# Patient Record
Sex: MALE | Race: WHITE | Employment: FULL TIME | URBAN - METROPOLITAN AREA
[De-identification: names, ages, dates, MRNs, and addresses within clinical notes are randomized per-mention and may not be internally consistent; named-entity substitution may affect disease eponyms.]

---

## 2024-03-27 ENCOUNTER — TELEPHONE (OUTPATIENT)
Dept: GASTROENTEROLOGY | Facility: CLINIC | Age: 34
End: 2024-03-27
Payer: OTHER GOVERNMENT

## 2024-03-27 ENCOUNTER — APPOINTMENT (OUTPATIENT)
Dept: SURGERY | Facility: CLINIC | Age: 34
End: 2024-03-27

## 2024-03-27 NOTE — TELEPHONE ENCOUNTER
[facility] was scheduled in the [facility] (wrong department).  I have left a message with patient to schedule his general surgery appt. With Dr. Paulson in our [place] office.

## 2024-03-28 ENCOUNTER — APPOINTMENT (OUTPATIENT)
Dept: SURGERY | Facility: CLINIC | Age: 34
End: 2024-03-28
Payer: OTHER GOVERNMENT

## 2024-03-29 ENCOUNTER — OFFICE VISIT (OUTPATIENT)
Dept: SURGERY | Facility: CLINIC | Age: 34
End: 2024-03-29
Payer: OTHER GOVERNMENT

## 2024-03-29 VITALS
SYSTOLIC BLOOD PRESSURE: 126 MMHG | HEIGHT: 74 IN | HEART RATE: 68 BPM | BODY MASS INDEX: 29.67 KG/M2 | OXYGEN SATURATION: 98 % | DIASTOLIC BLOOD PRESSURE: 85 MMHG | TEMPERATURE: 98.3 F | RESPIRATION RATE: 18 BRPM | WEIGHT: 231.2 LBS

## 2024-03-29 DIAGNOSIS — K80.10 CALCULUS OF GALLBLADDER WITH CHRONIC CHOLECYSTITIS WITHOUT OBSTRUCTION: Primary | ICD-10-CM

## 2024-03-29 PROBLEM — K80.64 CALCULUS OF GALLBLADDER AND BILE DUCT WITH CHRONIC CHOLECYSTITIS WITHOUT OBSTRUCTION: Status: RESOLVED | Noted: 2024-03-29 | Resolved: 2024-03-29

## 2024-03-29 PROBLEM — K80.64 CALCULUS OF GALLBLADDER AND BILE DUCT WITH CHRONIC CHOLECYSTITIS WITHOUT OBSTRUCTION: Status: ACTIVE | Noted: 2024-03-29

## 2024-03-29 PROCEDURE — 1036F TOBACCO NON-USER: CPT | Performed by: SURGERY

## 2024-03-29 PROCEDURE — 99204 OFFICE O/P NEW MOD 45 MIN: CPT | Performed by: SURGERY

## 2024-03-29 RX ORDER — SODIUM CHLORIDE, SODIUM LACTATE, POTASSIUM CHLORIDE, CALCIUM CHLORIDE 600; 310; 30; 20 MG/100ML; MG/100ML; MG/100ML; MG/100ML
100 INJECTION, SOLUTION INTRAVENOUS CONTINUOUS
Status: CANCELLED | OUTPATIENT
Start: 2024-04-16

## 2024-03-29 RX ORDER — CEFAZOLIN SODIUM 2 G/100ML
2 INJECTION, SOLUTION INTRAVENOUS ONCE
Status: CANCELLED | OUTPATIENT
Start: 2024-04-16 | End: 2024-03-29

## 2024-03-29 NOTE — LETTER
March 29, 2024     Lovely Walls MD  [address]  [facility] [address]    Patient: Nikhil Hartman   YOB: 1990   Date of Visit: 3/29/2024       Dear Dr. Lovely Walls MD:    Thank you for referring Nikhil Hartman to me for evaluation. Below are my notes for this consultation.  If you have questions, please do not hesitate to call me. I look forward to following your patient along with you.       Sincerely,     Adan Crockett MD      CC: No Recipients  ______________________________________________________________________________________    History Of Present Illness  HPI   The patient is a 33-year-old male who on February 5 developed postprandial right upper quadrant abdominal pain associated with nausea and vomiting.  He lost 40 pounds in 1 month due to inability to eat.  Fatty foods triggered the symptoms.  He has changed his diet and now has gained 10 pounds.  The pain radiates to his back.  No history of jaundice.  He had some diarrhea for 1 week in February which has resolved.    Past medical history:  Hypertension  ADHD  [place] teeth extraction.    Medications: Patient does not recall the names of his medications.  He was told to bring a list to the office.    Allergies: Possible allergy to penicillin as a child.  He does not recall the reaction.    Past Medical History  He has a past medical history of Acute frontal sinusitis, unspecified (10/23/2013), Cough, unspecified (01/04/2013), Other conditions influencing health status (02/12/2013), Pain in thoracic spine (04/12/2016), and Sprain of unspecified part of unspecified wrist and hand, initial encounter (09/04/2013).    Surgical History  He has a past surgical history that includes [place] tooth extraction.     Allergies  Cat dander and Penicillins    Social History  He reports that he has never smoked. He has never used smokeless tobacco. He reports that he does not currently use alcohol. He reports that he does not use  "drugs.    Family History  No family history on file.    Review of Systems  Review of Systems:  Constitutional:  no fever, no chills, weight decreased from 260 to 220 pounds in 1 month.  He has gained 10 pounds recently.  Neurological: No history of CVA or seizure disorder  Eyes: Visual change.  I told him to follow-up with his primary care physician or ophthalmologist.  ENT:  No recent hearing loss  Neck: Right-sided neck pain for which she is going to see a neurologist.  Cardiovascular: No chest pain, no history of cardiac disease such as myocardial infarction or arrhythmia or congestive heart failure  Pulmonary: Asthma.  No shortness of breath, no other history of pulmonary disease such as pneumonia or COPD  Breast: No history of breast disease or breast mass  Gastrointestinal:   As above.  No blood in stool.  No history of ulcers.  No liver or pancreas disease.  No intestinal disorder.  Genitourinary: No hematuria or dysuria, no kidney disease  Musculoskeletal:  no arthralgia, no muscle or bone pain  Integumentary:  no rash  Psychiatric:  No anxiety or depression  Endocrine:  no history of diabetes  Hematologic/Lymphatic: No easy bruising or bleeding          Last Recorded Vitals  Blood pressure 126/85, pulse 68, temperature 36.8 °C (98.3 °F), temperature source Temporal, resp. rate 18, height 1.88 m (6' 2\"), weight 105 kg (231 lb 3.2 oz), SpO2 98 %.    Physical Exam  Constitutional: Well-developed, well-nourished, alert and oriented, no acute distress  Skin: Warm and dry, no lesions, no rashes, no jaundice  HEENT: Normocephalic, atraumatic, EOMI, no scleral icterus, eyes have no redness or swelling or discharge, external inspection of ears and nose is normal, mucous membranes moist  Neck: Soft, nontender, no mass or adenopathy  Cardiac: Regular rate and rhythm, no murmur  Chest: Patent airway, clear to auscultation, normal breath sounds with good chest expansion, no wheezes or rales or rhonchi noted, thorax " symmetric  Abdomen: Nondistended, positive bowel sounds, soft, minimal right upper quadrant tenderness, otherwise nontender, no mass  Rectal: Not performed  Extremities: No injury, no lower extremity edema or calf tenderness  Lymphatic: No cervical adenopathy  Musculoskeletal: Range of motion intact, no joint swelling, normal strength  Neurological: Alert and oriented x3, intact sensory and motor function, no obvious focal neurologic abnormalities, normal gait  Psychological: Appropriate mood and behavior    Relevant Results  I reviewed the ultrasound report.  The images are not available for review.  Cholelithiasis.  Possible fatty liver.    I reviewed the labs from March 7, 2024: WBC 6.7, hemoglobin 16.9, platelet 285  Electrolytes normal, BUN 12, creatinine 1.44, glucose 73  Liver function tests and lipase normal    Assessment/Plan   Diagnoses and all orders for this visit:  Calculus of gallbladder with chronic cholecystitis without obstruction  -     Case Request Operating Room: Laparoscopic cholecystectomy with cholangiogram.  Possible open operation.; Standing  Other orders  -     Place in outpatient/hospital ambulatory surgery; Standing  -     Vital Signs; Standing  -     Pulse oximetry, spot; Standing  -     Apply sequential compression device; Standing  -     Insert peripheral IV; Standing  -     Saline lock IV; Standing  -     lactated Ringer's infusion  -     Full code; Standing  -     ceFAZolin in dextrose (iso-os) (Ancef) IVPB 2 g    33-year-old male with cholelithiasis and symptoms consistent with chronic cholecystitis.  I told the patient that some of his symptoms are not from his gallbladder and would not resolve postop.  Recommend laparoscopic cholecystectomy with cholangiogram with possible open operation.  I discussed the procedure and risks with the patient. The risks include bleeding, infection, bile leak, injury to surrounding structures such as the common bile duct/duodenum,  cardiac/pulmonary/renal complications, post op diarrhea.  The patient does wish to have the operation.  Electronic consent obtained.  Avoid fatty foods preop.  He reports he has been referred to a hepatologist for evaluation of his possible fatty liver.  He does not recall all of his medications and he will bring the list to the office.    Adan Crockett MD

## 2024-03-29 NOTE — H&P (VIEW-ONLY)
History Of Present Illness  HPI   The patient is a 33-year-old male who on February 5 developed postprandial right upper quadrant abdominal pain associated with nausea and vomiting.  He lost 40 pounds in 1 month due to inability to eat.  Fatty foods triggered the symptoms.  He has changed his diet and now has gained 10 pounds.  The pain radiates to his back.  No history of jaundice.  He had some diarrhea for 1 week in February which has resolved.    Past medical history:  Hypertension  ADHD  [place] teeth extraction.    Medications: Patient does not recall the names of his medications.  He was told to bring a list to the office.    Allergies: Possible allergy to penicillin as a child.  He does not recall the reaction.    Past Medical History  He has a past medical history of Acute frontal sinusitis, unspecified (10/23/2013), Cough, unspecified (01/04/2013), Other conditions influencing health status (02/12/2013), Pain in thoracic spine (04/12/2016), and Sprain of unspecified part of unspecified wrist and hand, initial encounter (09/04/2013).    Surgical History  He has a past surgical history that includes [place] tooth extraction.     Allergies  Cat dander and Penicillins    Social History  He reports that he has never smoked. He has never used smokeless tobacco. He reports that he does not currently use alcohol. He reports that he does not use drugs.    Family History  No family history on file.    Review of Systems  Review of Systems:  Constitutional:  no fever, no chills, weight decreased from 260 to 220 pounds in 1 month.  He has gained 10 pounds recently.  Neurological: No history of CVA or seizure disorder  Eyes: Visual change.  I told him to follow-up with his primary care physician or ophthalmologist.  ENT:  No recent hearing loss  Neck: Right-sided neck pain for which she is going to see a neurologist.  Cardiovascular: No chest pain, no history of cardiac disease such as myocardial infarction or arrhythmia  "or congestive heart failure  Pulmonary: Asthma.  No shortness of breath, no other history of pulmonary disease such as pneumonia or COPD  Breast: No history of breast disease or breast mass  Gastrointestinal:   As above.  No blood in stool.  No history of ulcers.  No liver or pancreas disease.  No intestinal disorder.  Genitourinary: No hematuria or dysuria, no kidney disease  Musculoskeletal:  no arthralgia, no muscle or bone pain  Integumentary:  no rash  Psychiatric:  No anxiety or depression  Endocrine:  no history of diabetes  Hematologic/Lymphatic: No easy bruising or bleeding          Last Recorded Vitals  Blood pressure 126/85, pulse 68, temperature 36.8 °C (98.3 °F), temperature source Temporal, resp. rate 18, height 1.88 m (6' 2\"), weight 105 kg (231 lb 3.2 oz), SpO2 98 %.    Physical Exam  Constitutional: Well-developed, well-nourished, alert and oriented, no acute distress  Skin: Warm and dry, no lesions, no rashes, no jaundice  HEENT: Normocephalic, atraumatic, EOMI, no scleral icterus, eyes have no redness or swelling or discharge, external inspection of ears and nose is normal, mucous membranes moist  Neck: Soft, nontender, no mass or adenopathy  Cardiac: Regular rate and rhythm, no murmur  Chest: Patent airway, clear to auscultation, normal breath sounds with good chest expansion, no wheezes or rales or rhonchi noted, thorax symmetric  Abdomen: Nondistended, positive bowel sounds, soft, minimal right upper quadrant tenderness, otherwise nontender, no mass  Rectal: Not performed  Extremities: No injury, no lower extremity edema or calf tenderness  Lymphatic: No cervical adenopathy  Musculoskeletal: Range of motion intact, no joint swelling, normal strength  Neurological: Alert and oriented x3, intact sensory and motor function, no obvious focal neurologic abnormalities, normal gait  Psychological: Appropriate mood and behavior    Relevant Results  I reviewed the ultrasound report.  The images are not " available for review.  Cholelithiasis.  Possible fatty liver.    I reviewed the labs from March 7, 2024: WBC 6.7, hemoglobin 16.9, platelet 285  Electrolytes normal, BUN 12, creatinine 1.44, glucose 73  Liver function tests and lipase normal    Assessment/Plan   Diagnoses and all orders for this visit:  Calculus of gallbladder with chronic cholecystitis without obstruction  -     Case Request Operating Room: Laparoscopic cholecystectomy with cholangiogram.  Possible open operation.; Standing  Other orders  -     Place in outpatient/hospital ambulatory surgery; Standing  -     Vital Signs; Standing  -     Pulse oximetry, spot; Standing  -     Apply sequential compression device; Standing  -     Insert peripheral IV; Standing  -     Saline lock IV; Standing  -     lactated Ringer's infusion  -     Full code; Standing  -     ceFAZolin in dextrose (iso-os) (Ancef) IVPB 2 g    33-year-old male with cholelithiasis and symptoms consistent with chronic cholecystitis.  I told the patient that some of his symptoms are not from his gallbladder and would not resolve postop.  Recommend laparoscopic cholecystectomy with cholangiogram with possible open operation.  I discussed the procedure and risks with the patient. The risks include bleeding, infection, bile leak, injury to surrounding structures such as the common bile duct/duodenum, cardiac/pulmonary/renal complications, post op diarrhea.  The patient does wish to have the operation.  Electronic consent obtained.  Avoid fatty foods preop.  He reports he has been referred to a hepatologist for evaluation of his possible fatty liver.  He does not recall all of his medications and he will bring the list to the office.    Adan Crockett MD

## 2024-03-29 NOTE — PROGRESS NOTES
History Of Present Illness  HPI   The patient is a 33-year-old male who on February 5 developed postprandial right upper quadrant abdominal pain associated with nausea and vomiting.  He lost 40 pounds in 1 month due to inability to eat.  Fatty foods triggered the symptoms.  He has changed his diet and now has gained 10 pounds.  The pain radiates to his back.  No history of jaundice.  He had some diarrhea for 1 week in February which has resolved.    Past medical history:  Hypertension  ADHD  [place] teeth extraction.    Medications: Patient does not recall the names of his medications.  He was told to bring a list to the office.    Allergies: Possible allergy to penicillin as a child.  He does not recall the reaction.    Past Medical History  He has a past medical history of Acute frontal sinusitis, unspecified (10/23/2013), Cough, unspecified (01/04/2013), Other conditions influencing health status (02/12/2013), Pain in thoracic spine (04/12/2016), and Sprain of unspecified part of unspecified wrist and hand, initial encounter (09/04/2013).    Surgical History  He has a past surgical history that includes [place] tooth extraction.     Allergies  Cat dander and Penicillins    Social History  He reports that he has never smoked. He has never used smokeless tobacco. He reports that he does not currently use alcohol. He reports that he does not use drugs.    Family History  No family history on file.    Review of Systems  Review of Systems:  Constitutional:  no fever, no chills, weight decreased from 260 to 220 pounds in 1 month.  He has gained 10 pounds recently.  Neurological: No history of CVA or seizure disorder  Eyes: Visual change.  I told him to follow-up with his primary care physician or ophthalmologist.  ENT:  No recent hearing loss  Neck: Right-sided neck pain for which she is going to see a neurologist.  Cardiovascular: No chest pain, no history of cardiac disease such as myocardial infarction or arrhythmia  "or congestive heart failure  Pulmonary: Asthma.  No shortness of breath, no other history of pulmonary disease such as pneumonia or COPD  Breast: No history of breast disease or breast mass  Gastrointestinal:   As above.  No blood in stool.  No history of ulcers.  No liver or pancreas disease.  No intestinal disorder.  Genitourinary: No hematuria or dysuria, no kidney disease  Musculoskeletal:  no arthralgia, no muscle or bone pain  Integumentary:  no rash  Psychiatric:  No anxiety or depression  Endocrine:  no history of diabetes  Hematologic/Lymphatic: No easy bruising or bleeding          Last Recorded Vitals  Blood pressure 126/85, pulse 68, temperature 36.8 °C (98.3 °F), temperature source Temporal, resp. rate 18, height 1.88 m (6' 2\"), weight 105 kg (231 lb 3.2 oz), SpO2 98 %.    Physical Exam  Constitutional: Well-developed, well-nourished, alert and oriented, no acute distress  Skin: Warm and dry, no lesions, no rashes, no jaundice  HEENT: Normocephalic, atraumatic, EOMI, no scleral icterus, eyes have no redness or swelling or discharge, external inspection of ears and nose is normal, mucous membranes moist  Neck: Soft, nontender, no mass or adenopathy  Cardiac: Regular rate and rhythm, no murmur  Chest: Patent airway, clear to auscultation, normal breath sounds with good chest expansion, no wheezes or rales or rhonchi noted, thorax symmetric  Abdomen: Nondistended, positive bowel sounds, soft, minimal right upper quadrant tenderness, otherwise nontender, no mass  Rectal: Not performed  Extremities: No injury, no lower extremity edema or calf tenderness  Lymphatic: No cervical adenopathy  Musculoskeletal: Range of motion intact, no joint swelling, normal strength  Neurological: Alert and oriented x3, intact sensory and motor function, no obvious focal neurologic abnormalities, normal gait  Psychological: Appropriate mood and behavior    Relevant Results  I reviewed the ultrasound report.  The images are not " available for review.  Cholelithiasis.  Possible fatty liver.    I reviewed the labs from March 7, 2024: WBC 6.7, hemoglobin 16.9, platelet 285  Electrolytes normal, BUN 12, creatinine 1.44, glucose 73  Liver function tests and lipase normal    Assessment/Plan   Diagnoses and all orders for this visit:  Calculus of gallbladder with chronic cholecystitis without obstruction  -     Case Request Operating Room: Laparoscopic cholecystectomy with cholangiogram.  Possible open operation.; Standing  Other orders  -     Place in outpatient/hospital ambulatory surgery; Standing  -     Vital Signs; Standing  -     Pulse oximetry, spot; Standing  -     Apply sequential compression device; Standing  -     Insert peripheral IV; Standing  -     Saline lock IV; Standing  -     lactated Ringer's infusion  -     Full code; Standing  -     ceFAZolin in dextrose (iso-os) (Ancef) IVPB 2 g    33-year-old male with cholelithiasis and symptoms consistent with chronic cholecystitis.  I told the patient that some of his symptoms are not from his gallbladder and would not resolve postop.  Recommend laparoscopic cholecystectomy with cholangiogram with possible open operation.  I discussed the procedure and risks with the patient. The risks include bleeding, infection, bile leak, injury to surrounding structures such as the common bile duct/duodenum, cardiac/pulmonary/renal complications, post op diarrhea.  The patient does wish to have the operation.  Electronic consent obtained.  Avoid fatty foods preop.  He reports he has been referred to a hepatologist for evaluation of his possible fatty liver.  He does not recall all of his medications and he will bring the list to the office.    Adan Crockett MD

## 2024-04-02 RX ORDER — DEXTROAMPHETAMINE SACCHARATE, AMPHETAMINE ASPARTATE MONOHYDRATE, DEXTROAMPHETAMINE SULFATE AND AMPHETAMINE SULFATE 6.25; 6.25; 6.25; 6.25 MG/1; MG/1; MG/1; MG/1
25 CAPSULE, EXTENDED RELEASE ORAL DAILY
COMMUNITY

## 2024-04-02 RX ORDER — DEXTROAMPHETAMINE SACCHARATE, AMPHETAMINE ASPARTATE, DEXTROAMPHETAMINE SULFATE AND AMPHETAMINE SULFATE 2.5; 2.5; 2.5; 2.5 MG/1; MG/1; MG/1; MG/1
1 TABLET ORAL 2 TIMES DAILY
COMMUNITY

## 2024-04-02 RX ORDER — ESOMEPRAZOLE MAGNESIUM 40 MG/1
40 CAPSULE, DELAYED RELEASE ORAL
COMMUNITY
Start: 2024-03-07

## 2024-04-15 RX ORDER — CYCLOBENZAPRINE HCL 10 MG
10 TABLET ORAL 3 TIMES DAILY PRN
COMMUNITY

## 2024-04-15 RX ORDER — FLUTICASONE PROPIONATE 50 MCG
1 SPRAY, SUSPENSION (ML) NASAL DAILY
COMMUNITY

## 2024-04-15 NOTE — PREPROCEDURE INSTRUCTIONS
Current Medications   Medication Instructions    amphetamine-dextroamphetamine (Adderall) 10 mg tablet Stop 2 days before surgery    amphetamine-dextroamphetamine XR (Adderall XR) 25 mg 24 hr capsule Stop 2 days before surgery    cyclobenzaprine (Flexeril) 10 mg tablet Take morning of surgery with sip of water, no other fluids    esomeprazole (NexIUM) 40 mg DR capsule Take morning of surgery with sip of water, no other fluids    fluticasone (Flonase) 50 mcg/actuation nasal spray May take in the am of surgery          NPO Instructions: Nothing to eat or drink after midnight. May take medications as discussed in the am of surgery with a sip of water.   Additional Instructions: Enter through main entrance of [facility] building, located at [address]. Proceed to registration, located in the back right hand corner. You will need your ID and insurance card for registration. Please ensure you have a responsible adult to drive you home.     Shower the morning of or night before your procedure. After you shower avoid lotions, powders, deodorants or anything applied to the skin. If you wear contacts or glasses, wear the glasses. If you do not have glasses, please bring a case for your contacts. You may wear hearing aids and dentures, bring a case for them or we will provide one. Make sure you wear something loose and comfortable. Keep in mind your surgery type and wear something that will accommodate incisions or bandages. Please remove all jewelry and piercings.      For further questions Alessio MENDOZA can be contacted at [phone] between 7AM-3PM.

## 2024-04-16 ENCOUNTER — ANESTHESIA (OUTPATIENT)
Dept: OPERATING ROOM | Facility: HOSPITAL | Age: 34
End: 2024-04-16
Payer: OTHER GOVERNMENT

## 2024-04-16 ENCOUNTER — APPOINTMENT (OUTPATIENT)
Dept: RADIOLOGY | Facility: HOSPITAL | Age: 34
End: 2024-04-16
Payer: OTHER GOVERNMENT

## 2024-04-16 ENCOUNTER — ANESTHESIA EVENT (OUTPATIENT)
Dept: OPERATING ROOM | Facility: HOSPITAL | Age: 34
End: 2024-04-16
Payer: OTHER GOVERNMENT

## 2024-04-16 ENCOUNTER — HOSPITAL ENCOUNTER (OUTPATIENT)
Facility: HOSPITAL | Age: 34
Setting detail: OUTPATIENT SURGERY
Discharge: HOME | End: 2024-04-16
Attending: SURGERY | Admitting: SURGERY
Payer: OTHER GOVERNMENT

## 2024-04-16 VITALS
TEMPERATURE: 97.7 F | BODY MASS INDEX: 29.71 KG/M2 | HEART RATE: 74 BPM | OXYGEN SATURATION: 95 % | HEIGHT: 74 IN | DIASTOLIC BLOOD PRESSURE: 82 MMHG | WEIGHT: 231.48 LBS | SYSTOLIC BLOOD PRESSURE: 136 MMHG | RESPIRATION RATE: 16 BRPM

## 2024-04-16 DIAGNOSIS — K80.64 CALCULUS OF GALLBLADDER AND BILE DUCT WITH CHRONIC CHOLECYSTITIS WITHOUT OBSTRUCTION: Primary | ICD-10-CM

## 2024-04-16 DIAGNOSIS — K80.10 CALCULUS OF GALLBLADDER WITH CHRONIC CHOLECYSTITIS WITHOUT OBSTRUCTION: ICD-10-CM

## 2024-04-16 PROBLEM — F90.0 ATTENTION DEFICIT HYPERACTIVITY DISORDER (ADHD), PREDOMINANTLY INATTENTIVE TYPE: Status: ACTIVE | Noted: 2022-10-26

## 2024-04-16 PROCEDURE — 2500000004 HC RX 250 GENERAL PHARMACY W/ HCPCS (ALT 636 FOR OP/ED): Performed by: SURGERY

## 2024-04-16 PROCEDURE — 2500000004 HC RX 250 GENERAL PHARMACY W/ HCPCS (ALT 636 FOR OP/ED): Performed by: ANESTHESIOLOGIST ASSISTANT

## 2024-04-16 PROCEDURE — 7100000009 HC PHASE TWO TIME - INITIAL BASE CHARGE: Performed by: SURGERY

## 2024-04-16 PROCEDURE — 3700000001 HC GENERAL ANESTHESIA TIME - INITIAL BASE CHARGE: Performed by: SURGERY

## 2024-04-16 PROCEDURE — 2500000005 HC RX 250 GENERAL PHARMACY W/O HCPCS: Performed by: ANESTHESIOLOGIST ASSISTANT

## 2024-04-16 PROCEDURE — 3600000008 HC OR TIME - EACH INCREMENTAL 1 MINUTE - PROCEDURE LEVEL THREE: Performed by: SURGERY

## 2024-04-16 PROCEDURE — 2500000004 HC RX 250 GENERAL PHARMACY W/ HCPCS (ALT 636 FOR OP/ED): Performed by: ANESTHESIOLOGY

## 2024-04-16 PROCEDURE — 7100000001 HC RECOVERY ROOM TIME - INITIAL BASE CHARGE: Performed by: SURGERY

## 2024-04-16 PROCEDURE — 88304 TISSUE EXAM BY PATHOLOGIST: CPT | Performed by: PATHOLOGY

## 2024-04-16 PROCEDURE — 2720000007 HC OR 272 NO HCPCS: Performed by: SURGERY

## 2024-04-16 PROCEDURE — 88304 TISSUE EXAM BY PATHOLOGIST: CPT | Mod: TC,PARLAB | Performed by: SURGERY

## 2024-04-16 PROCEDURE — 7100000002 HC RECOVERY ROOM TIME - EACH INCREMENTAL 1 MINUTE: Performed by: SURGERY

## 2024-04-16 PROCEDURE — 3600000003 HC OR TIME - INITIAL BASE CHARGE - PROCEDURE LEVEL THREE: Performed by: SURGERY

## 2024-04-16 PROCEDURE — C1729 CATH, DRAINAGE: HCPCS | Performed by: SURGERY

## 2024-04-16 PROCEDURE — 2780000003 HC OR 278 NO HCPCS: Performed by: SURGERY

## 2024-04-16 PROCEDURE — 7100000010 HC PHASE TWO TIME - EACH INCREMENTAL 1 MINUTE: Performed by: SURGERY

## 2024-04-16 PROCEDURE — 47563 LAPARO CHOLECYSTECTOMY/GRAPH: CPT | Performed by: SURGERY

## 2024-04-16 PROCEDURE — 2500000004 HC RX 250 GENERAL PHARMACY W/ HCPCS (ALT 636 FOR OP/ED): Mod: JZ | Performed by: SURGERY

## 2024-04-16 PROCEDURE — 76000 FLUOROSCOPY <1 HR PHYS/QHP: CPT | Mod: 59

## 2024-04-16 PROCEDURE — A47563 PR LAP,CHOLECYSTECTOMY/GRAPH: Performed by: ANESTHESIOLOGIST ASSISTANT

## 2024-04-16 PROCEDURE — A47563 PR LAP,CHOLECYSTECTOMY/GRAPH: Performed by: ANESTHESIOLOGY

## 2024-04-16 PROCEDURE — A4217 STERILE WATER/SALINE, 500 ML: HCPCS | Performed by: SURGERY

## 2024-04-16 PROCEDURE — 3700000002 HC GENERAL ANESTHESIA TIME - EACH INCREMENTAL 1 MINUTE: Performed by: SURGERY

## 2024-04-16 RX ORDER — BUPIVACAINE HYDROCHLORIDE 5 MG/ML
INJECTION, SOLUTION EPIDURAL; INTRACAUDAL AS NEEDED
Status: DISCONTINUED | OUTPATIENT
Start: 2024-04-16 | End: 2024-04-16 | Stop reason: HOSPADM

## 2024-04-16 RX ORDER — ACETAMINOPHEN 325 MG/1
650 TABLET ORAL EVERY 4 HOURS PRN
Status: DISCONTINUED | OUTPATIENT
Start: 2024-04-16 | End: 2024-04-16 | Stop reason: HOSPADM

## 2024-04-16 RX ORDER — SODIUM CHLORIDE, SODIUM LACTATE, POTASSIUM CHLORIDE, CALCIUM CHLORIDE 600; 310; 30; 20 MG/100ML; MG/100ML; MG/100ML; MG/100ML
100 INJECTION, SOLUTION INTRAVENOUS CONTINUOUS
Status: DISCONTINUED | OUTPATIENT
Start: 2024-04-16 | End: 2024-04-16 | Stop reason: HOSPADM

## 2024-04-16 RX ORDER — DIPHENHYDRAMINE HYDROCHLORIDE 50 MG/ML
12.5 INJECTION INTRAMUSCULAR; INTRAVENOUS ONCE AS NEEDED
Status: DISCONTINUED | OUTPATIENT
Start: 2024-04-16 | End: 2024-04-16 | Stop reason: HOSPADM

## 2024-04-16 RX ORDER — KETOROLAC TROMETHAMINE 30 MG/ML
30 INJECTION, SOLUTION INTRAMUSCULAR; INTRAVENOUS ONCE AS NEEDED
Status: DISCONTINUED | OUTPATIENT
Start: 2024-04-16 | End: 2024-04-16 | Stop reason: HOSPADM

## 2024-04-16 RX ORDER — GABAPENTIN 300 MG/1
300 CAPSULE ORAL 3 TIMES DAILY PRN
Qty: 15 CAPSULE | Refills: 0 | Status: SHIPPED | OUTPATIENT
Start: 2024-04-16

## 2024-04-16 RX ORDER — IPRATROPIUM BROMIDE 0.5 MG/2.5ML
500 SOLUTION RESPIRATORY (INHALATION) ONCE
Status: DISCONTINUED | OUTPATIENT
Start: 2024-04-16 | End: 2024-04-16 | Stop reason: HOSPADM

## 2024-04-16 RX ORDER — ONDANSETRON HYDROCHLORIDE 2 MG/ML
4 INJECTION, SOLUTION INTRAVENOUS ONCE AS NEEDED
Status: DISCONTINUED | OUTPATIENT
Start: 2024-04-16 | End: 2024-04-16 | Stop reason: HOSPADM

## 2024-04-16 RX ORDER — KETOROLAC TROMETHAMINE 30 MG/ML
INJECTION, SOLUTION INTRAMUSCULAR; INTRAVENOUS AS NEEDED
Status: DISCONTINUED | OUTPATIENT
Start: 2024-04-16 | End: 2024-04-16

## 2024-04-16 RX ORDER — MIDAZOLAM HYDROCHLORIDE 1 MG/ML
INJECTION, SOLUTION INTRAMUSCULAR; INTRAVENOUS AS NEEDED
Status: DISCONTINUED | OUTPATIENT
Start: 2024-04-16 | End: 2024-04-16

## 2024-04-16 RX ORDER — ONDANSETRON HYDROCHLORIDE 2 MG/ML
4 INJECTION, SOLUTION INTRAVENOUS ONCE AS NEEDED
Status: COMPLETED | OUTPATIENT
Start: 2024-04-16 | End: 2024-04-16

## 2024-04-16 RX ORDER — HYDROCODONE BITARTRATE AND ACETAMINOPHEN 5; 325 MG/1; MG/1
1 TABLET ORAL EVERY 6 HOURS PRN
Qty: 8 TABLET | Refills: 0 | Status: SHIPPED | OUTPATIENT
Start: 2024-04-16

## 2024-04-16 RX ORDER — ALBUTEROL SULFATE 0.83 MG/ML
2.5 SOLUTION RESPIRATORY (INHALATION) ONCE AS NEEDED
Status: DISCONTINUED | OUTPATIENT
Start: 2024-04-16 | End: 2024-04-16 | Stop reason: HOSPADM

## 2024-04-16 RX ORDER — PROPOFOL 10 MG/ML
INJECTION, EMULSION INTRAVENOUS AS NEEDED
Status: DISCONTINUED | OUTPATIENT
Start: 2024-04-16 | End: 2024-04-16

## 2024-04-16 RX ORDER — LIDOCAINE HCL/PF 100 MG/5ML
SYRINGE (ML) INTRAVENOUS AS NEEDED
Status: DISCONTINUED | OUTPATIENT
Start: 2024-04-16 | End: 2024-04-16

## 2024-04-16 RX ORDER — ROCURONIUM BROMIDE 10 MG/ML
INJECTION, SOLUTION INTRAVENOUS AS NEEDED
Status: DISCONTINUED | OUTPATIENT
Start: 2024-04-16 | End: 2024-04-16

## 2024-04-16 RX ORDER — DEXMEDETOMIDINE HYDROCHLORIDE 100 UG/ML
INJECTION, SOLUTION INTRAVENOUS AS NEEDED
Status: DISCONTINUED | OUTPATIENT
Start: 2024-04-16 | End: 2024-04-16

## 2024-04-16 RX ORDER — MEPERIDINE HYDROCHLORIDE 25 MG/ML
12.5 INJECTION INTRAMUSCULAR; INTRAVENOUS; SUBCUTANEOUS EVERY 10 MIN PRN
Status: DISCONTINUED | OUTPATIENT
Start: 2024-04-16 | End: 2024-04-16 | Stop reason: HOSPADM

## 2024-04-16 RX ORDER — LIDOCAINE HYDROCHLORIDE 10 MG/ML
0.1 INJECTION INFILTRATION; PERINEURAL ONCE
Status: DISCONTINUED | OUTPATIENT
Start: 2024-04-16 | End: 2024-04-16 | Stop reason: HOSPADM

## 2024-04-16 RX ORDER — FENTANYL CITRATE 50 UG/ML
INJECTION, SOLUTION INTRAMUSCULAR; INTRAVENOUS AS NEEDED
Status: DISCONTINUED | OUTPATIENT
Start: 2024-04-16 | End: 2024-04-16

## 2024-04-16 RX ORDER — SODIUM CHLORIDE 0.9 G/100ML
IRRIGANT IRRIGATION AS NEEDED
Status: DISCONTINUED | OUTPATIENT
Start: 2024-04-16 | End: 2024-04-16 | Stop reason: HOSPADM

## 2024-04-16 RX ORDER — ONDANSETRON HYDROCHLORIDE 2 MG/ML
INJECTION, SOLUTION INTRAVENOUS AS NEEDED
Status: DISCONTINUED | OUTPATIENT
Start: 2024-04-16 | End: 2024-04-16

## 2024-04-16 RX ORDER — CEFAZOLIN SODIUM 2 G/100ML
2 INJECTION, SOLUTION INTRAVENOUS ONCE
Status: COMPLETED | OUTPATIENT
Start: 2024-04-16 | End: 2024-04-16

## 2024-04-16 RX ADMIN — SUGAMMADEX 200 MG: 100 INJECTION, SOLUTION INTRAVENOUS at 08:40

## 2024-04-16 RX ADMIN — Medication 30 MG: at 07:37

## 2024-04-16 RX ADMIN — ONDANSETRON 4 MG: 2 INJECTION INTRAMUSCULAR; INTRAVENOUS at 11:01

## 2024-04-16 RX ADMIN — FENTANYL CITRATE 50 MCG: 50 INJECTION, SOLUTION INTRAMUSCULAR; INTRAVENOUS at 08:46

## 2024-04-16 RX ADMIN — HYDROMORPHONE HYDROCHLORIDE 0.5 MG: 1 INJECTION, SOLUTION INTRAMUSCULAR; INTRAVENOUS; SUBCUTANEOUS at 10:40

## 2024-04-16 RX ADMIN — MIDAZOLAM 2 MG: 1 INJECTION INTRAMUSCULAR; INTRAVENOUS at 07:30

## 2024-04-16 RX ADMIN — FENTANYL CITRATE 50 MCG: 50 INJECTION, SOLUTION INTRAMUSCULAR; INTRAVENOUS at 07:52

## 2024-04-16 RX ADMIN — DEXMEDETOMIDINE HCL 12 MCG: 100 INJECTION INTRAVENOUS at 07:57

## 2024-04-16 RX ADMIN — ROCURONIUM BROMIDE 20 MG: 10 INJECTION, SOLUTION INTRAVENOUS at 07:52

## 2024-04-16 RX ADMIN — HYDROMORPHONE HYDROCHLORIDE 0.5 MG: 1 INJECTION, SOLUTION INTRAMUSCULAR; INTRAVENOUS; SUBCUTANEOUS at 09:12

## 2024-04-16 RX ADMIN — ONDANSETRON 4 MG: 2 INJECTION INTRAMUSCULAR; INTRAVENOUS at 08:39

## 2024-04-16 RX ADMIN — FENTANYL CITRATE 100 MCG: 50 INJECTION, SOLUTION INTRAMUSCULAR; INTRAVENOUS at 07:37

## 2024-04-16 RX ADMIN — DEXMEDETOMIDINE HCL 4 MCG: 100 INJECTION INTRAVENOUS at 08:01

## 2024-04-16 RX ADMIN — PROPOFOL 200 MG: 10 INJECTION, EMULSION INTRAVENOUS at 07:37

## 2024-04-16 RX ADMIN — KETOROLAC TROMETHAMINE 30 MG: 30 INJECTION, SOLUTION INTRAMUSCULAR; INTRAVENOUS at 08:33

## 2024-04-16 RX ADMIN — ROCURONIUM BROMIDE 50 MG: 10 INJECTION, SOLUTION INTRAVENOUS at 07:37

## 2024-04-16 RX ADMIN — LIDOCAINE HYDROCHLORIDE 100 MG: 20 INJECTION INTRAVENOUS at 07:37

## 2024-04-16 RX ADMIN — CEFAZOLIN SODIUM 2 G: 2 INJECTION, SOLUTION INTRAVENOUS at 07:44

## 2024-04-16 RX ADMIN — DEXMEDETOMIDINE HCL 4 MCG: 100 INJECTION INTRAVENOUS at 08:31

## 2024-04-16 RX ADMIN — SODIUM CHLORIDE, SODIUM LACTATE, POTASSIUM CHLORIDE, AND CALCIUM CHLORIDE 100 ML/HR: 600; 310; 30; 20 INJECTION, SOLUTION INTRAVENOUS at 06:50

## 2024-04-16 RX ADMIN — HYDROMORPHONE HYDROCHLORIDE 0.5 MG: 1 INJECTION, SOLUTION INTRAMUSCULAR; INTRAVENOUS; SUBCUTANEOUS at 10:03

## 2024-04-16 RX ADMIN — DEXMEDETOMIDINE HCL 4 MCG: 100 INJECTION INTRAVENOUS at 08:13

## 2024-04-16 RX ADMIN — DEXAMETHASONE SODIUM PHOSPHATE 8 MG: 4 INJECTION, SOLUTION INTRAMUSCULAR; INTRAVENOUS at 07:40

## 2024-04-16 RX ADMIN — SODIUM CHLORIDE, SODIUM LACTATE, POTASSIUM CHLORIDE, AND CALCIUM CHLORIDE: 600; 310; 30; 20 INJECTION, SOLUTION INTRAVENOUS at 08:23

## 2024-04-16 RX ADMIN — HYDROMORPHONE HYDROCHLORIDE 0.5 MG: 1 INJECTION, SOLUTION INTRAMUSCULAR; INTRAVENOUS; SUBCUTANEOUS at 09:27

## 2024-04-16 SDOH — HEALTH STABILITY: MENTAL HEALTH: CURRENT SMOKER: 0

## 2024-04-16 ASSESSMENT — PAIN SCALES - GENERAL
PAINLEVEL_OUTOF10: 6
PAINLEVEL_OUTOF10: 3
PAINLEVEL_OUTOF10: 1
PAINLEVEL_OUTOF10: 8
PAINLEVEL_OUTOF10: 3
PAIN_LEVEL: 1
PAINLEVEL_OUTOF10: 6
PAINLEVEL_OUTOF10: 7
PAINLEVEL_OUTOF10: 9
PAINLEVEL_OUTOF10: 4
PAINLEVEL_OUTOF10: 9
PAINLEVEL_OUTOF10: 8
PAINLEVEL_OUTOF10: 8
PAINLEVEL_OUTOF10: 9
PAINLEVEL_OUTOF10: 6
PAINLEVEL_OUTOF10: 8
PAINLEVEL_OUTOF10: 0 - NO PAIN

## 2024-04-16 ASSESSMENT — PAIN - FUNCTIONAL ASSESSMENT

## 2024-04-16 ASSESSMENT — COLUMBIA-SUICIDE SEVERITY RATING SCALE - C-SSRS
2. HAVE YOU ACTUALLY HAD ANY THOUGHTS OF KILLING YOURSELF?: NO
6. HAVE YOU EVER DONE ANYTHING, STARTED TO DO ANYTHING, OR PREPARED TO DO ANYTHING TO END YOUR LIFE?: NO
2. HAVE YOU ACTUALLY HAD ANY THOUGHTS OF KILLING YOURSELF?: NO
1. IN THE PAST MONTH, HAVE YOU WISHED YOU WERE DEAD OR WISHED YOU COULD GO TO SLEEP AND NOT WAKE UP?: NO
1. IN THE PAST MONTH, HAVE YOU WISHED YOU WERE DEAD OR WISHED YOU COULD GO TO SLEEP AND NOT WAKE UP?: NO

## 2024-04-16 NOTE — OP NOTE
LAPAROSCOPIC CHOLECYSTECTOMY WITH OPERATIVE CHOLANGIOGRAMS & C-ARM/ Operative Note     Date: 2024  OR Location: [facility]    Name: Nikhil Hartman, : 1990, Age: 33 y.o., MRN: [id number], Sex: male    Diagnosis  Pre-op Diagnosis     * Calculus of gallbladder with chronic cholecystitis without obstruction [K80.10] Post-op Diagnosis     * Calculus of gallbladder with chronic cholecystitis without obstruction [K80.10]     Procedures  LAPAROSCOPIC CHOLECYSTECTOMY WITH OPERATIVE CHOLANGIOGRAMS & C-ARM/  [id number] - AR LAPS SURG CHOLECYSTECTOMY W/CHOLANGIOGRAPHY      Surgeons      * Adan Crockett - Primary    Resident/Fellow/Other Assistant:  Surgeons and Role:  * No surgeons found with a matching role *    Procedure Summary  Anesthesia: General  ASA: II  Anesthesia Staff: Anesthesiologist: Sharif Burnett MD  C-AA: KENNY Lea  Estimated Blood Loss: 0mL  Intra-op Medications:   Administrations occurring from 0730 to 0930 on 24:   Medication Name Total Dose   bupivacaine PF (Marcaine) 0.5 % (5 mg/mL) injection 10 mL   sodium chloride 0.9 % irrigation solution 3,500 mL   lactated Ringer's infusion 111.67 mL   ceFAZolin in dextrose (iso-os) (Ancef) IVPB 2 g 2 g              Anesthesia Record               Intraprocedure I/O Totals          Intake    lactated Ringer's infusion 1000.00 mL    ceFAZolin in dextrose (iso-os) (Ancef) IVPB 2 g 100.00 mL    Total Intake 1100 mL       Output    Est. Blood Loss 3 mL    NG/OG Tube Output 100 mL    Total Output 103 mL       Net    Net Volume 997 mL          Specimen:   ID Type Source Tests Collected by Time   1 : GALLBLADDER Tissue GALLBLADDER CHOLECYSTECTOMY SURGICAL PATHOLOGY EXAM Adan Crockett MD 2024 0828        Staff:   Circulator: Maine Chery RN  Relief Circulator: Colton Rodriguez RN  Scrub Person: Shahla Michaud RN; Winnie Hernandez         Drains and/or Catheters: * None in log *    Tourniquet Times:         Implants:     Findings:  Cholelithiasis with chronic cholecystitis    Indications: Nikhil Hartman is an 33 y.o. male who is having surgery for Calculus of gallbladder with chronic cholecystitis without obstruction [K80.10].     The patient was seen in the preoperative area. The risks, benefits, complications, treatment options, non-operative alternatives, expected recovery and outcomes were discussed with the patient. The possibilities of reaction to medication, pulmonary aspiration, injury to surrounding structures, bleeding, recurrent infection, the need for additional procedures, failure to diagnose a condition, and creating a complication requiring transfusion or operation were discussed with the patient. The patient concurred with the proposed plan, giving informed consent.  The site of surgery was properly noted/marked if necessary per policy. The patient has been actively warmed in preoperative area. Preoperative antibiotics have been ordered and given within 1 hours of incision. Venous thrombosis prophylaxis have been ordered including bilateral sequential compression devices    Procedure Details: Following informed consent patient was taken to the op room.  Huddle was performed.  Patient was placed into supine position.  The patient was given general anesthetic.  Sequential compression devices are in place and functioning.  The patient was given Ancef IV.  The abdomen was prepped and draped in sterile fashion.  A timeout was performed.  An infraumbilical midline skin incision was made and extended through the subcutaneous tissue.  0 Vicryl sutures were placed laterally in the fascia.  The fascia was opened in the midline.  A blunt 12 mm port was inserted under direct vision and secured with 0 Vicryl sutures.  The balloon was inflated.  The abdomen was insufflated carbon oxide to a pressure of 12 mmHg.  The scope was inserted.  Peritoneal cavity was inspected.  [The visualized portions of the stomach, small bowel, colon, omentum and  liver were unremarkable].  Two 5 mm ports were placed under direct vision one into the epigastrium and one into the right upper quadrant.  The gallbladder was retracted and the triangle of Calot dissected.  The cystic duct and cystic artery were freed from surrounding tissue.  The posterior portion of the liver plate was freed from the gallbladder.  2 structures were seen entering the gallbladder, the cystic duct and cystic artery.  The cystic duct was clipped adjacent to the neck the gallbladder then opened and milked retrograde.  A cholangiocatheter was percutaneously inserted into the right upper quadrant through a 14-gauge Angiocath.  The cholangiocatheter was placed into the cystic duct and clipped into place.  Cholangiograms were performed which [appeared to be normal].  The clip and cholangiocatheter removed.  The cystic duct was triply clipped proximal with care being taken not to injure or occlude the common bile duct.  The cystic duct was divided between clips.  The cystic artery was triply clipped proximal, singly clipped distal and divided.  A posterior branch of the cystic artery was doubly clipped proximal, singly clipped distal and divided.  The gallbladder was freed from the liver with electrocautery and [] temporarily stored in the omentum.  The right upper quadrant was irrigated and suctioned till clear.  A 5 mm scope was placed in the epigastric port and the gallbladder removed through the umbilical port without difficulty.  The fascia at the the umbilical port was closed with a running 0 Vicryl suture obtaining an airtight closure.  The previously placed lateral Vicryls were tied to each other.  The remaining ports were removed under direct vision and the abdomen deflated.  Half percent plain Marcaine was infiltrated into each of the fascial incisions and skin closed with running 4-0 Vicryl subcuticular sutures.  Dressings were placed.  The patient was taken to the recovery in satisfactory  condition having tolerated procedure well.  At the conclusion of the case the gallbladder was opened and found to contain stones.  The gallbladder and stones were sent to pathology.  Complications:  None; patient tolerated the procedure well.    Disposition: PACU - hemodynamically stable.  Condition: stable         Adan A Bon  Phone Number: [phone]

## 2024-04-16 NOTE — ANESTHESIA PROCEDURE NOTES
Airway  Date/Time: 4/16/2024 7:39 AM  Urgency: elective    Airway not difficult    Staffing  Performed: KENNY   Authorized by: Sharif Burnett MD    Performed by: KENNY Lea  Patient location during procedure: OR    Indications and Patient Condition  Indications for airway management: anesthesia and airway protection  Spontaneous Ventilation: absent  Sedation level: deep  Preoxygenated: yes  Patient position: sniffing  MILS maintained throughout  Mask difficulty assessment: 2 - vent by mask + OA or adjuvant +/- NMBA  Planned trial extubation    Final Airway Details  Final airway type: endotracheal airway      Successful airway: ETT     Successful intubation technique: video laryngoscopy  Facilitating devices/methods: intubating stylet  Endotracheal tube insertion site: oral  Blade: My  Blade size: #4  ETT size (mm): 8.0  Cormack-Lehane Classification: grade I - full view of glottis  Placement verified by: capnometry   Measured from: lips  ETT to lips (cm): 23  Number of attempts at approach: 1  Number of other approaches attempted: 0    Additional Comments  BMV with 10cm OA. Easy intubation with elective Ramirez Mac 4 blade. Lips and teeth in preanesthetic conditon.

## 2024-04-16 NOTE — ANESTHESIA POSTPROCEDURE EVALUATION
Patient: Nikhil Hartman    Procedure Summary       Date: 04/16/24 Room / Location: [facility] 08 / [facility]    Anesthesia Start: 0729 Anesthesia Stop: 0902    Procedure: LAPAROSCOPIC CHOLECYSTECTOMY WITH OPERATIVE CHOLANGIOGRAMS & C-ARM/ Diagnosis:       Calculus of gallbladder with chronic cholecystitis without obstruction      (Calculus of gallbladder with chronic cholecystitis without obstruction [K80.10])    Surgeons: Adan Crockett MD Responsible Provider: Sharif Burnett MD    Anesthesia Type: general ASA Status: 2            Anesthesia Type: general    Vitals Value Taken Time   /93 04/16/24 0900   Temp 36.5 °C (97.7 °F) 04/16/24 0900   Pulse 74 04/16/24 0901   Resp 16 04/16/24 0900   SpO2 100 % 04/16/24 0901   Vitals shown include unfiled device data.    Anesthesia Post Evaluation    Patient location during evaluation: PACU  Patient participation: complete - patient participated  Level of consciousness: awake and alert  Pain score: 1  Pain management: adequate  Airway patency: patent  Cardiovascular status: acceptable  Respiratory status: acceptable  Hydration status: acceptable  Postoperative Nausea and Vomiting: none        There were no known notable events for this encounter.

## 2024-04-16 NOTE — ANESTHESIA PREPROCEDURE EVALUATION
Patient: Nikhil Hartman    Procedure Information       Date/Time: 04/16/24 0730    Procedure: LAPAROSCOPIC CHOLECYSTECTOMY WITH OPERATIVE CHOLANGIOGRAMS & C-ARM/ POSSIBLE OPEN    Location: [facility] 08 / [facility]    Surgeons: Adan Crockett MD            Relevant Problems   Liver   (+) Calculus of gallbladder with chronic cholecystitis without obstruction       Clinical information reviewed:      Problems              NPO Detail:  No data recorded     Physical Exam    Airway  Mallampati: IV  TM distance: <3 FB  Neck ROM: full     Cardiovascular - normal exam  Rhythm: regular  Rate: normal     Dental - normal exam     Pulmonary - normal exam     Abdominal      Other findings: Patient with beard          Anesthesia Plan    History of general anesthesia?: yes  History of complications of general anesthesia?: no    ASA 2     general     The patient is not a current smoker.    intravenous induction   Postoperative administration of opioids is intended.  Trial extubation is planned.  Anesthetic plan and risks discussed with patient.  Use of blood products discussed with patient who consented to blood products.    Plan discussed with CAA.

## 2024-04-17 ASSESSMENT — PAIN SCALES - GENERAL: PAINLEVEL_OUTOF10: 3

## 2024-04-25 LAB
LABORATORY COMMENT REPORT: NORMAL
PATH REPORT.FINAL DX SPEC: NORMAL
PATH REPORT.GROSS SPEC: NORMAL
PATH REPORT.RELEVANT HX SPEC: NORMAL
PATH REPORT.TOTAL CANCER: NORMAL

## 2024-04-30 PROBLEM — Z09 POSTOP CHECK: Status: ACTIVE | Noted: 2024-04-30

## 2024-04-30 NOTE — PROGRESS NOTES
"History Of Present Illness  Nikhil Hartman is a 33 y.o. male status post laparoscopic cholecystectomy with cholangiogram on April 16, 2024.  Patient reports that his preop abdominal pain is much better.  Mild right-sided abdominal soreness.  He is tolerating a vegetarian diet without nausea or vomiting.     Last Recorded Vitals  Blood pressure 117/79, pulse 75, temperature 36.4 °C (97.5 °F), temperature source Temporal, resp. rate 16, height 1.88 m (6' 2\"), weight 104 kg (228 lb 9.6 oz), SpO2 97%.    Physical Exam  General: Well-developed, well-nourished, no acute distress, alert and oriented  Wound: Intact, no erythema or signs of infection  Abdomen: Nondistended, soft and nontender    Relevant Results  Surgical Pathology Exam: [id number]  Order: [id number]   Collected 4/16/2024 08:28       Status: Final result       Visible to patient: No (inaccessible in [facility])       Dx: Calculus of gallbladder with chronic ...    0 Result Notes       Component  Resulting Agency   FINAL DIAGNOSIS   A. GALLBLADDER CHOLECYSTECTOMY:   -Gallbladder with cholesterolosis and mild chronic cholecystitis           Assessment/Plan   Diagnoses and all orders for this visit:  Postop check  Recovering well.  Diet as tolerated.  Follow-up as needed.      Adan Crockett MD  "

## 2024-05-01 ENCOUNTER — OFFICE VISIT (OUTPATIENT)
Dept: SURGERY | Facility: CLINIC | Age: 34
End: 2024-05-01
Payer: OTHER GOVERNMENT

## 2024-05-01 VITALS
OXYGEN SATURATION: 97 % | RESPIRATION RATE: 16 BRPM | WEIGHT: 228.6 LBS | BODY MASS INDEX: 29.34 KG/M2 | HEIGHT: 74 IN | TEMPERATURE: 97.5 F | SYSTOLIC BLOOD PRESSURE: 117 MMHG | HEART RATE: 75 BPM | DIASTOLIC BLOOD PRESSURE: 79 MMHG

## 2024-05-01 DIAGNOSIS — Z09 POSTOP CHECK: Primary | ICD-10-CM

## 2024-05-01 PROCEDURE — 99024 POSTOP FOLLOW-UP VISIT: CPT | Performed by: SURGERY

## 2024-05-01 PROCEDURE — 1036F TOBACCO NON-USER: CPT | Performed by: SURGERY

## 2024-05-01 NOTE — LETTER
"May 1, 2024       No Recipients    Patient: Nikhil Hartman   YOB: 1990   Date of Visit: 5/1/2024       Dear Dr. Stapleton Recipients:    Thank you for referring Nikhil Hartman to me for evaluation. Below are my notes for this consultation.  If you have questions, please do not hesitate to call me. I look forward to following your patient along with you.       Sincerely,     Adan Crockett MD      CC:   No Recipients  ______________________________________________________________________________________    History Of Present Illness  Nikhil Hartman is a 33 y.o. male status post laparoscopic cholecystectomy with cholangiogram on April 16, 2024.  Patient reports that his preop abdominal pain is much better.  Mild right-sided abdominal soreness.  He is tolerating a vegetarian diet without nausea or vomiting.     Last Recorded Vitals  Blood pressure 117/79, pulse 75, temperature 36.4 °C (97.5 °F), temperature source Temporal, resp. rate 16, height 1.88 m (6' 2\"), weight 104 kg (228 lb 9.6 oz), SpO2 97%.    Physical Exam  General: Well-developed, well-nourished, no acute distress, alert and oriented  Wound: Intact, no erythema or signs of infection  Abdomen: Nondistended, soft and nontender    Relevant Results  Surgical Pathology Exam: [id number]  Order: [id number]   Collected 4/16/2024 08:28       Status: Final result       Visible to patient: No (inaccessible in [facility])       Dx: Calculus of gallbladder with chronic ...    0 Result Notes       Component  Resulting Agency   FINAL DIAGNOSIS   A. GALLBLADDER CHOLECYSTECTOMY:   -Gallbladder with cholesterolosis and mild chronic cholecystitis           Assessment/Plan  Diagnoses and all orders for this visit:  Postop check  Recovering well.  Diet as tolerated.  Follow-up as needed.      Adan Crockett MD  "

## 2024-05-01 NOTE — LETTER
"May 1, 2024     Lovely Walls MD  [address]  [place] [address]    Patient: Nikhil Hartman   YOB: 1990   Date of Visit: 5/1/2024       Dear Dr. Lovely Walls MD:    Thank you for referring Nikhil Hartman to me for evaluation. Below are my notes for this consultation.  If you have questions, please do not hesitate to call me. I look forward to following your patient along with you.       Sincerely,     Adan Crockett MD      CC: No Recipients  ______________________________________________________________________________________    History Of Present Illness  Nikhil Hartman is a 33 y.o. male status post laparoscopic cholecystectomy with cholangiogram on April 16, 2024.  Patient reports that his preop abdominal pain is much better.  Mild right-sided abdominal soreness.  He is tolerating a vegetarian diet without nausea or vomiting.     Last Recorded Vitals  Blood pressure 117/79, pulse 75, temperature 36.4 °C (97.5 °F), temperature source Temporal, resp. rate 16, height 1.88 m (6' 2\"), weight 104 kg (228 lb 9.6 oz), SpO2 97%.    Physical Exam  General: Well-developed, well-nourished, no acute distress, alert and oriented  Wound: Intact, no erythema or signs of infection  Abdomen: Nondistended, soft and nontender    Relevant Results  Surgical Pathology Exam: [id number]  Order: [id number]   Collected 4/16/2024 08:28       Status: Final result       Visible to patient: No (inaccessible in [facility])       Dx: Calculus of gallbladder with chronic ...    0 Result Notes       Component  Resulting Agency   FINAL DIAGNOSIS   A. GALLBLADDER CHOLECYSTECTOMY:   -Gallbladder with cholesterolosis and mild chronic cholecystitis           Assessment/Plan  Diagnoses and all orders for this visit:  Postop check  Recovering well.  Diet as tolerated.  Follow-up as needed.      Adan Crockett MD  "

## 2024-05-14 NOTE — PROGRESS NOTES
"History Of Present Illness  Nikhil Hartman is a 33 y.o. male status post laparoscopic cholecystectomy with cholangiogram on April 16, 2024.  Intraoperative cholangiogram appeared normal.  Pathology showed mild chronic cholecystitis.  The patient has intermittent abdominal discomfort which is gradually improving.  He also complained of some mid back pain with activity.  His appetite has improved and he has no nausea or vomiting.  He is concerned he may have irritable bowel syndrome as he has occasional soft stool.     Last Recorded Vitals  Blood pressure 142/88, pulse 91, temperature 36.6 °C (97.9 °F), temperature source Temporal, resp. rate 16, height 1.88 m (6' 2\"), weight 104 kg (229 lb 6.4 oz), SpO2 97%.    Physical Exam  General: Well-developed, well-nourished, no acute distress, alert and oriented  Wound: Intact, no erythema or signs of infection  Abdomen: Nondistended, soft and nontender.  No incisional hernia.  Extremities: No lower extremity edema or calf tenderness     Assessment/Plan   Diagnoses and all orders for this visit:  Postop check  Recovering well.  Abdominal exam benign.  Reassured patient.  The back pain may be musculoskeletal in origin.  If IBS symptoms continue, he should schedule an appointment with gastroenterology.  Follow-up in 1 month.        Adan Crockett MD  "

## 2024-05-15 ENCOUNTER — OFFICE VISIT (OUTPATIENT)
Dept: SURGERY | Facility: CLINIC | Age: 34
End: 2024-05-15
Payer: OTHER GOVERNMENT

## 2024-05-15 VITALS
SYSTOLIC BLOOD PRESSURE: 142 MMHG | DIASTOLIC BLOOD PRESSURE: 88 MMHG | HEIGHT: 74 IN | BODY MASS INDEX: 29.44 KG/M2 | OXYGEN SATURATION: 97 % | WEIGHT: 229.4 LBS | TEMPERATURE: 97.9 F | HEART RATE: 91 BPM | RESPIRATION RATE: 16 BRPM

## 2024-05-15 DIAGNOSIS — Z09 POSTOP CHECK: Primary | ICD-10-CM

## 2024-05-15 PROCEDURE — 99024 POSTOP FOLLOW-UP VISIT: CPT | Performed by: SURGERY

## 2024-05-15 NOTE — LETTER
"May 15, 2024     Lovely Walls MD  [address]  [facility] [address]    Patient: Nikhil Hartman   YOB: 1990   Date of Visit: 5/15/2024       Dear Dr. Lovely Walls MD:    Thank you for referring Nikhil Hartman to me for evaluation. Below are my notes for this consultation.  If you have questions, please do not hesitate to call me. I look forward to following your patient along with you.       Sincerely,     Adan Crockett MD      CC: No Recipients  ______________________________________________________________________________________    History Of Present Illness  Nikhil Hartman is a 33 y.o. male status post laparoscopic cholecystectomy with cholangiogram on April 16, 2024.  Intraoperative cholangiogram appeared normal.  Pathology showed mild chronic cholecystitis.  The patient has intermittent abdominal discomfort which is gradually improving.  He also complained of some mid back pain with activity.  His appetite has improved and he has no nausea or vomiting.  He is concerned he may have irritable bowel syndrome as he has occasional soft stool.     Last Recorded Vitals  Blood pressure 142/88, pulse 91, temperature 36.6 °C (97.9 °F), temperature source Temporal, resp. rate 16, height 1.88 m (6' 2\"), weight 104 kg (229 lb 6.4 oz), SpO2 97%.    Physical Exam  General: Well-developed, well-nourished, no acute distress, alert and oriented  Wound: Intact, no erythema or signs of infection  Abdomen: Nondistended, soft and nontender.  No incisional hernia.  Extremities: No lower extremity edema or calf tenderness     Assessment/Plan  Diagnoses and all orders for this visit:  Postop check  Recovering well.  Abdominal exam benign.  Reassured patient.  The back pain may be musculoskeletal in origin.  If IBS symptoms continue, he should schedule an appointment with gastroenterology.  Follow-up in 1 month.        Adan Crockett MD    "

## 2024-06-14 ENCOUNTER — APPOINTMENT (OUTPATIENT)
Dept: SURGERY | Facility: CLINIC | Age: 34
End: 2024-06-14
Payer: OTHER GOVERNMENT

## 2024-06-18 ENCOUNTER — TELEPHONE (OUTPATIENT)
Dept: GASTROENTEROLOGY | Facility: CLINIC | Age: 34
End: 2024-06-18
Payer: OTHER GOVERNMENT

## 2024-06-20 ENCOUNTER — TELEPHONE (OUTPATIENT)
Dept: GASTROENTEROLOGY | Facility: CLINIC | Age: 34
End: 2024-06-20
Payer: OTHER GOVERNMENT

## 2024-06-20 NOTE — TELEPHONE ENCOUNTER
Patient stopped in to get medical records from previous procedure for insurance.   Patient is also requesting return to work note.   Patient would like a call back to discuss.

## 2024-06-20 NOTE — LETTER
June 21, 2024     Patient: Nikhil Hartman   YOB: 1990   Date of Visit: 6/20/2024       To Whom It May Concern:    It is my medical opinion that Nikhil Hartman may return to work on 6/24/24 with no restrictions .    If you have any questions or concerns, please don't hesitate to call.         Sincerely,        Adan Crockett MD

## 2024-06-21 NOTE — TELEPHONE ENCOUNTER
I SPOKE WITH PATIENT YESTERDAY AND HE IS NEEDING RTW LETTER FOR 6/24/24. AND WANTS COPY OF OP NOT FROM SURGERY.     OK WITH DR DE LA O FOR LETTER, BUT PT CAN GET OP NOTE FROM HIS MYCHART. WE ARE NOT ABLE TO PRINT FROM THE OFFICE ANT LONGER    [id number]

## 2024-09-05 ENCOUNTER — APPOINTMENT (OUTPATIENT)
Dept: GASTROENTEROLOGY | Facility: CLINIC | Age: 34
End: 2024-09-05
Payer: OTHER GOVERNMENT

## 2024-09-05 VITALS
HEIGHT: 74 IN | DIASTOLIC BLOOD PRESSURE: 80 MMHG | HEART RATE: 74 BPM | SYSTOLIC BLOOD PRESSURE: 122 MMHG | BODY MASS INDEX: 27.26 KG/M2 | WEIGHT: 212.4 LBS

## 2024-09-05 DIAGNOSIS — R10.13 DYSPEPSIA AND DISORDER OF FUNCTION OF STOMACH: ICD-10-CM

## 2024-09-05 DIAGNOSIS — K58.2 IRRITABLE BOWEL SYNDROME WITH BOTH CONSTIPATION AND DIARRHEA: Primary | ICD-10-CM

## 2024-09-05 DIAGNOSIS — R19.7 DIARRHEA, UNSPECIFIED TYPE: Primary | ICD-10-CM

## 2024-09-05 DIAGNOSIS — K31.9 DYSPEPSIA AND DISORDER OF FUNCTION OF STOMACH: ICD-10-CM

## 2024-09-05 PROCEDURE — 1036F TOBACCO NON-USER: CPT | Performed by: INTERNAL MEDICINE

## 2024-09-05 PROCEDURE — 3008F BODY MASS INDEX DOCD: CPT | Performed by: INTERNAL MEDICINE

## 2024-09-05 PROCEDURE — 99203 OFFICE O/P NEW LOW 30 MIN: CPT | Performed by: INTERNAL MEDICINE

## 2024-09-05 RX ORDER — SUMATRIPTAN 50 MG/1
100 TABLET, FILM COATED ORAL AS NEEDED
COMMUNITY
Start: 2023-08-24

## 2024-09-05 RX ORDER — CETIRIZINE HYDROCHLORIDE 10 MG/1
TABLET ORAL
COMMUNITY
Start: 2023-08-24

## 2024-09-05 RX ORDER — SILDENAFIL 50 MG/1
50 TABLET, FILM COATED ORAL
COMMUNITY
Start: 2023-05-09

## 2024-09-05 RX ORDER — RIZATRIPTAN BENZOATE 10 MG/1
10 TABLET, ORALLY DISINTEGRATING ORAL
COMMUNITY
Start: 2024-08-08

## 2024-09-05 RX ORDER — ALBUTEROL SULFATE 90 UG/1
INHALANT RESPIRATORY (INHALATION)
COMMUNITY
Start: 2023-08-24

## 2024-09-05 RX ORDER — MONTELUKAST SODIUM 10 MG/1
TABLET ORAL
COMMUNITY
Start: 2023-08-24

## 2024-09-05 RX ORDER — FLUTICASONE FUROATE, UMECLIDINIUM BROMIDE AND VILANTEROL TRIFENATATE 200; 62.5; 25 UG/1; UG/1; UG/1
POWDER RESPIRATORY (INHALATION)
COMMUNITY
Start: 2023-05-09

## 2024-09-05 RX ORDER — TOPIRAMATE 25 MG/1
25 TABLET ORAL
COMMUNITY
Start: 2024-08-08

## 2024-09-05 NOTE — PROGRESS NOTES
This 33-year-old gentleman had a cholecystectomy in February of this year pathology showed chronic cholecystitis and cholesterolosis.  Subsequently he changed his diet dramatically he is practically a vegetarian he denies any weight loss.  He is he is to get episodes of constipation and diarrhea.  Prior to the cholecystectomy was also having a lot of abdominal pain he was unable to keep any food down.    Recently he was laid off from his work he is seeing mental health specialist psychiatrist and also or a psychotherapist he is not taking his new medications other than for attention deficit syndrome.    Social history he is to drink rather more than moderation while he was in the [employer].  He served in the [employer] for about 9 years until 2022.  He had some asthma problems in the [employer] he also got COVID and developed some asthma from the same as well.  Past surgical history cholecystectomy.  Family history he does not know too much about his biological father he does not speak to his biological mother.  He is  with no children his wife is a .    Currently he does not smoke or drink alcoholic beverages.    At 10 point review of system he had thorough cardiovascular evaluation which apparently was all negative he has mild asthma as well as mental health issues.  About which she was very vague and he was getting psychiatric and psychological evaluations.  He has history of attention deficit syndrome and also migraine.  Otherwise 10 point review of system is negative denies any weight loss thyroid issues gluten sensitivity has history of mild asthma.  He feels it was related [place] and also COVID infection.    On physical examination HEENT is unremarkable no jaundice or scleral icterus or pallor.  Oral cavity well-hydrated neck is supple carotid 2+ thyroid is not enlarged no adenopathy neck axilla or the groin clinically looks euthyroid.    Heart sounds were  normal    Chest is clear to auscultation and percussion    Examination abdomen was completely normal.  No hernias masses tenderness rigidity or guarding or visible peristalsis was noted good bowel sounds were heard no peripheral edema mental status appears to be alert oriented times space and events he epi he admits to be mildly depressed does not take any medications.    Skin and appendages negative no neurological focal deficits were noted.    Available clinical data shows pathology gallbladder showed cholesterolosis and chronic cholecystitis.  CBC and CMP from March 2024 was normal.    Clinical impression it is likely that his symptoms are on the basis of irritable bowel syndrome.  He may have underlying chronic gastritis as well.  I will also request celiac disease profile.  He may need upper GI endoscopy examination small bowel biopsies as well.

## (undated) DEVICE — STRIP, SKIN CLOSURE, STERI STRIP, REINFORCED, 0.5 X 4 IN

## (undated) DEVICE — CARE KIT, LAPAROSCOPIC, ADVANCED

## (undated) DEVICE — Device

## (undated) DEVICE — SCISSOR TIP, ENDOCUT, LAPAROSCOPIC

## (undated) DEVICE — SOLUTION, INJECTION, CONTRAST, IOTHALAMATE MEGLUMINE 60%, CONRAY, 50 CC, VIAL

## (undated) DEVICE — CATHETER, CHOLANGIOGRAM, 4.5 FR, 45 CM, 18 IN

## (undated) DEVICE — SLEEVE, KII, Z-THREAD, 5X100CM

## (undated) DEVICE — DRESSING, TELFA, 3X4

## (undated) DEVICE — TROCAR SYSTEM, BALLOON, KII GELPORT, 12 X 100MM

## (undated) DEVICE — CONNECTOR, IV, ONE-LINK, NEEDLE-FREE W/NEUTRAL FLUID DISPLACEMENT, LF

## (undated) DEVICE — SLEEVE, VASO PRESS, CALF GARMENT, MEDIUM, GREEN

## (undated) DEVICE — CATHETER, IV, ANGIOCATH, 12 G X 3 IN, FEP POLYMER

## (undated) DEVICE — TROCAR, KII OPTICAL BLADELESS 5MM Z THREAD 100MM LNGTH

## (undated) DEVICE — GRASPER TIP, LAPCLINCH, DISP

## (undated) DEVICE — CAUTERY, PENCIL, PUSH BUTTON, SMOKE EVAC, 70MM

## (undated) DEVICE — CLIP, ENDO APPLIER LIGAMAX 5MM

## (undated) DEVICE — DRESSING, TRANSPARENT, TEGADERM, 2-3/8 X 2-3/4 IN